# Patient Record
Sex: MALE | Race: WHITE
[De-identification: names, ages, dates, MRNs, and addresses within clinical notes are randomized per-mention and may not be internally consistent; named-entity substitution may affect disease eponyms.]

---

## 2018-11-15 ENCOUNTER — HOSPITAL ENCOUNTER (EMERGENCY)
Dept: HOSPITAL 72 - EMR | Age: 28
Discharge: HOME | End: 2018-11-15
Payer: MEDICAID

## 2018-11-15 VITALS — SYSTOLIC BLOOD PRESSURE: 119 MMHG | DIASTOLIC BLOOD PRESSURE: 74 MMHG

## 2018-11-15 VITALS — WEIGHT: 137 LBS | HEIGHT: 72 IN | BODY MASS INDEX: 18.56 KG/M2

## 2018-11-15 VITALS — SYSTOLIC BLOOD PRESSURE: 117 MMHG | DIASTOLIC BLOOD PRESSURE: 72 MMHG

## 2018-11-15 DIAGNOSIS — R07.9: ICD-10-CM

## 2018-11-15 DIAGNOSIS — R00.2: Primary | ICD-10-CM

## 2018-11-15 DIAGNOSIS — F41.9: ICD-10-CM

## 2018-11-15 LAB
ADD MANUAL DIFF: NO
ALBUMIN SERPL-MCNC: 3.8 G/DL (ref 3.4–5)
ALBUMIN/GLOB SERPL: 1 {RATIO} (ref 1–2.7)
ALP SERPL-CCNC: 56 U/L (ref 46–116)
ALT SERPL-CCNC: 20 U/L (ref 12–78)
ANION GAP SERPL CALC-SCNC: 7 MMOL/L (ref 5–15)
APPEARANCE UR: CLEAR
APTT PPP: (no result) S
AST SERPL-CCNC: 15 U/L (ref 15–37)
BASOPHILS NFR BLD AUTO: 1.2 % (ref 0–2)
BILIRUB SERPL-MCNC: 0.4 MG/DL (ref 0.2–1)
BUN SERPL-MCNC: 8 MG/DL (ref 7–18)
CALCIUM SERPL-MCNC: 9.3 MG/DL (ref 8.5–10.1)
CHLORIDE SERPL-SCNC: 105 MMOL/L (ref 98–107)
CK MB SERPL-MCNC: 0.7 NG/ML (ref 0–3.6)
CK SERPL-CCNC: 107 U/L (ref 26–308)
CO2 SERPL-SCNC: 28 MMOL/L (ref 21–32)
CREAT SERPL-MCNC: 0.9 MG/DL (ref 0.55–1.3)
EOSINOPHIL NFR BLD AUTO: 1.4 % (ref 0–3)
ERYTHROCYTE [DISTWIDTH] IN BLOOD BY AUTOMATED COUNT: 10.7 % (ref 11.6–14.8)
GLOBULIN SER-MCNC: 3.7 G/DL
GLUCOSE UR STRIP-MCNC: NEGATIVE MG/DL
HCT VFR BLD CALC: 46.1 % (ref 42–52)
HGB BLD-MCNC: 16 G/DL (ref 14.2–18)
KETONES UR QL STRIP: NEGATIVE
LEUKOCYTE ESTERASE UR QL STRIP: NEGATIVE
LYMPHOCYTES NFR BLD AUTO: 26.2 % (ref 20–45)
MCV RBC AUTO: 86 FL (ref 80–99)
MONOCYTES NFR BLD AUTO: 12.2 % (ref 1–10)
NEUTROPHILS NFR BLD AUTO: 58.9 % (ref 45–75)
NITRITE UR QL STRIP: NEGATIVE
PH UR STRIP: 7 [PH] (ref 4.5–8)
PLATELET # BLD: 170 K/UL (ref 150–450)
POTASSIUM SERPL-SCNC: 3.9 MMOL/L (ref 3.5–5.1)
PROT UR QL STRIP: NEGATIVE
RBC # BLD AUTO: 5.35 M/UL (ref 4.7–6.1)
SODIUM SERPL-SCNC: 140 MMOL/L (ref 136–145)
SP GR UR STRIP: 1.01 (ref 1–1.03)
UROBILINOGEN UR-MCNC: NORMAL MG/DL (ref 0–1)
WBC # BLD AUTO: 4.2 K/UL (ref 4.8–10.8)

## 2018-11-15 PROCEDURE — 94640 AIRWAY INHALATION TREATMENT: CPT

## 2018-11-15 PROCEDURE — 82553 CREATINE MB FRACTION: CPT

## 2018-11-15 PROCEDURE — 84484 ASSAY OF TROPONIN QUANT: CPT

## 2018-11-15 PROCEDURE — 99284 EMERGENCY DEPT VISIT MOD MDM: CPT

## 2018-11-15 PROCEDURE — 36415 COLL VENOUS BLD VENIPUNCTURE: CPT

## 2018-11-15 PROCEDURE — 80307 DRUG TEST PRSMV CHEM ANLYZR: CPT

## 2018-11-15 PROCEDURE — 82550 ASSAY OF CK (CPK): CPT

## 2018-11-15 PROCEDURE — 93005 ELECTROCARDIOGRAM TRACING: CPT

## 2018-11-15 PROCEDURE — 80053 COMPREHEN METABOLIC PANEL: CPT

## 2018-11-15 PROCEDURE — 96360 HYDRATION IV INFUSION INIT: CPT

## 2018-11-15 PROCEDURE — 71045 X-RAY EXAM CHEST 1 VIEW: CPT

## 2018-11-15 PROCEDURE — 94664 DEMO&/EVAL PT USE INHALER: CPT

## 2018-11-15 PROCEDURE — 85025 COMPLETE CBC W/AUTO DIFF WBC: CPT

## 2018-11-15 PROCEDURE — 81003 URINALYSIS AUTO W/O SCOPE: CPT

## 2018-11-15 NOTE — EMERGENCY ROOM REPORT
History of Present Illness


General


Chief Complaint:  Palpitations


Source:  Patient





Present Illness


[facility]


This patient complains of chest pain and palpitations for the past 3 days.  He 

states the symptoms became more severe over the past 24 hours.  He states he 

has been breathing and a lot of smoke.  He attends [employer] in 

[place] and states that he was required to stay in the region and also uses 

tobacco.  He also drank a red border.  He states he feels like a fast heart rate

, short of breath and chest pain.  He denies recent illness.  Denies cough or 

congestion.  He denies fever or chills.  He denies nausea or vomiting.  He has 

no other complaints.


Allergies:  


Coded Allergies:  


     No Known Allergies (Unverified , 11/15/18)





Patient History


Past Medical History:  none, see triage record, asthma


Social History:  Reports: smoking, alcohol use


Reviewed Nursing Documentation:  PMH: Agreed; PSxH: Agreed





Nursing Documentation-PMH


Past Medical History:  No History, Except For


Hx Asthma:  Yes





Review of Systems


All Other Systems:  negative except mentioned in HPI





Physical Exam





Vital Signs








  Date Time  Temp Pulse Resp B/P (MAP) Pulse Ox O2 Delivery O2 Flow Rate FiO2


 


11/15/18 12:25 98.1 89 18 117/77 98 Room Air  


 


11/15/18 12:54        21








Sp02 EP Interpretation:  reviewed, normal


General Appearance:  no apparent distress, alert, GCS 15, non-toxic


Head:  normocephalic, atraumatic


Eyes:  bilateral eye normal inspection, bilateral eye PERRL


ENT:  hearing grossly normal, normal pharynx, no angioedema, normal voice


Neck:  full range of motion, supple/symm/no masses


Respiratory:  chest non-tender, lungs clear, normal breath sounds, no 

respiratory distress, no retraction, no accessory muscle use, speaking full 

sentences


Cardiovascular #1:  regular rate, rhythm, no edema


Gastrointestinal:  normal bowel sounds, non tender, soft, non-distended, no 

guarding, no rebound


Rectal:  deferred


Musculoskeletal:  back normal, gait/station normal, normal range of motion, non-

tender


Neurologic:  alert, oriented x3, responsive, motor strength/tone normal, 

sensory intact, speech normal


Psychiatric:  judgement/insight normal, memory normal, mood/affect normal, no 

suicidal/homicidal ideation


Skin:  normal color, no rash, warm/dry, well hydrated





Medical Decision Making


Diagnostic Impression:  


 Primary Impression:  


 Palpitations


 Additional Impression:  


 Anxiety attack


ER Course


The patient is low risk chest pain. I considered PE, PTX, acute coronary 

syndrome, aortic dissection, pneumonia which is unlikely given hx, CE, CXR.  

Low risk PERC and Wells.  Negative work-up to include Cardiac enzymes, CXR, 

EKG.  Given history and PE I do not feel that this patient needs further 

evaluation at this time.  I will give the patient an albuterol inhaler given a 

history of asthma and the recent smoke exposure.  I suspect this patient is 

also having an anxiety attack related to his recent stressful situation with 

being in the [employer] and being kept [employer]. However, the 

patient's evaluation is benign overall.  The patient was instructed to follow-

up closely with their PCP and close return precautions were given.





Laboratory Tests








Test


  11/15/18


12:50 11/15/18


13:15


 


White Blood Count


  4.2 K/UL


(4.8-10.8)  L 


 


 


Red Blood Count


  5.35 M/UL


(4.70-6.10) 


 


 


Hemoglobin


  16.0 G/DL


(14.2-18.0) 


 


 


Hematocrit


  46.1 %


(42.0-52.0) 


 


 


Mean Corpuscular Volume 86 FL (80-99)   


 


Mean Corpuscular Hemoglobin


  30.0 PG


(27.0-31.0) 


 


 


Mean Corpuscular Hemoglobin


Concent 34.8 G/DL


(32.0-36.0) 


 


 


Red Cell Distribution Width


  10.7 %


(11.6-14.8)  L 


 


 


Platelet Count


  170 K/UL


(150-450) 


 


 


Mean Platelet Volume


  9.8 FL


(6.5-10.1) 


 


 


Neutrophils (%) (Auto)


  58.9 %


(45.0-75.0) 


 


 


Lymphocytes (%) (Auto)


  26.2 %


(20.0-45.0) 


 


 


Monocytes (%) (Auto)


  12.2 %


(1.0-10.0)  H 


 


 


Eosinophils (%) (Auto)


  1.4 %


(0.0-3.0) 


 


 


Basophils (%) (Auto)


  1.2 %


(0.0-2.0) 


 


 


Sodium Level


  140 MMOL/L


(136-145) 


 


 


Potassium Level


  3.9 MMOL/L


(3.5-5.1) 


 


 


Chloride Level


  105 MMOL/L


() 


 


 


Carbon Dioxide Level


  28 MMOL/L


(21-32) 


 


 


Anion Gap


  7 mmol/L


(5-15) 


 


 


Blood Urea Nitrogen


  8 mg/dL (7-18)


  


 


 


Creatinine


  0.9 MG/DL


(0.55-1.30) 


 


 


Estimate Glomerular


Filtration Rate > 60 mL/min


(>60) 


 


 


Glucose Level


  109 MG/DL


()  H 


 


 


Calcium Level


  9.3 MG/DL


(8.5-10.1) 


 


 


Total Bilirubin


  0.4 MG/DL


(0.2-1.0) 


 


 


Aspartate Amino Transferase


(AST) 15 U/L (15-37)


  


 


 


Alanine Aminotransferase (ALT)


  20 U/L (12-78)


  


 


 


Alkaline Phosphatase


  56 U/L


() 


 


 


Total Creatine Kinase


  107 U/L


() 


 


 


Creatine Kinase MB


  0.7 NG/ML


(0.0-3.6) 


 


 


Creatine Kinase MB Relative


Index 0.6  


  


 


 


Troponin I


  0.000 ng/mL


(0.000-0.056) 


 


 


Total Protein


  7.5 G/DL


(6.4-8.2) 


 


 


Albumin


  3.8 G/DL


(3.4-5.0) 


 


 


Globulin 3.7 g/dL   


 


Albumin/Globulin Ratio 1.0 (1.0-2.7)   


 


Urine Color  Pale yellow  


 


Urine Appearance  Clear  


 


Urine pH  7 (4.5-8.0)  


 


Urine Specific Gravity


  


  1.010


(1.005-1.035)


 


Urine Protein


  


  Negative


(NEGATIVE)


 


Urine Glucose (UA)


  


  Negative


(NEGATIVE)


 


Urine Ketones


  


  Negative


(NEGATIVE)


 


Urine Blood


  


  Negative


(NEGATIVE)


 


Urine Nitrite


  


  Negative


(NEGATIVE)


 


Urine Bilirubin


  


  Negative


(NEGATIVE)


 


Urine Urobilinogen


  


  Normal MG/DL


(0.0-1.0)


 


Urine Leukocyte Esterase


  


  Negative


(NEGATIVE)


 


Urine Opiates Screen


  


  Negative


(NEGATIVE)


 


Urine Barbiturates Screen


  


  Negative


(NEGATIVE)


 


Phencyclidine (PCP) Screen


  


  Negative


(NEGATIVE)


 


Urine Amphetamines Screen


  


  Negative


(NEGATIVE)


 


Urine Benzodiazepines Screen


  


  Negative


(NEGATIVE)


 


Urine Cocaine Screen


  


  Negative


(NEGATIVE)


 


Urine Marijuana (THC) Screen


  


  Negative


(NEGATIVE)








EKG Diagnostic Results


Rate:  normal


Rhythm:  NSR


ST Segments:  no acute changes





Rhythm Strip Diag. Results


EP Interpretation:  yes


Rate:  70's


Rhythm:  NSR, no PVC's, no ectopy





Chest X-Ray Diagnostic Results


Chest X-Ray Diagnostic Results :  


   Chest X-Ray Ordered:  Yes


   # of Views/Limited/Complete:  1 View


   Indication:  Chest Pain


   EP Interpretation:  Yes


   Interpretation:  no consolidation, no effusion, no pneumothorax, no acute 

cardiopulmonary disease


   Impression:  No acute disease


   Electronically Signed by:  Selina





Last Vital Signs








  Date Time  Temp Pulse Resp B/P (MAP) Pulse Ox O2 Delivery O2 Flow Rate FiO2


 


11/15/18 13:08  78 16  99 Room Air  21


 


11/15/18 12:38 98.1   117/72    








Status:  improved


Disposition:  HOME, SELF-CARE


Condition:  Improved


Referrals:  


NOT CHOSEN IPA/MD,REFERRING (PCP)


Patient Instructions:  Palpitations, Panic Attacks











Jenni Anand DO Nov 15, 2018 14:11

## 2018-11-15 NOTE — DIAGNOSTIC IMAGING REPORT
Indication: Chest pain

 

Technique: One view of the chest

 

Comparison: none

 

Findings: Lungs and pleural spaces are clear. Heart size is normal. There is minimal

upper thoracic scoliotic deformity

 

Impression: No acute process